# Patient Record
(demographics unavailable — no encounter records)

---

## 2025-03-25 NOTE — PHYSICAL EXAM

## 2025-03-25 NOTE — HISTORY OF PRESENT ILLNESS
[Mother] : mother [Fruit] : fruit [Vegetables] : vegetables [Meat] : meat [Grains] : grains [Eggs] : eggs [Dairy] : dairy [Normal] : Normal [Sippy cup use] : Sippy cup use [Brushing teeth] : Brushing teeth [Vitamin] : Primary Fluoride Source: Vitamin [Playtime (60 min/d)] : Playtime 60 min a day [Temper Tantrums] : Temper Tantrums [< 2 hrs of screen time] : Less than 2 hrs of screen time [No] : No cigarette smoke exposure [Water heater temperature set at <120 degrees F] : Water heater temperature set at <120 degrees F [Car seat in back seat] : Car seat in back seat [Carbon Monoxide Detectors] : Carbon monoxide detectors [Smoke Detectors] : Smoke detectors [Supervised play near cars and streets] : Supervised play near cars and streets [Delayed] : Delayed [FreeTextEntry7] : Doing well [de-identified] : None [FreeTextEntry8] : not yet potty trained [FreeTextEntry1] : 2.5 year old girl here for routine PE. Doing well. No current concerns. Good po/uop/bm. Normal sleep and activity. Many words, short phrases, understands all. Jumps, climbs stairs, pretend play. Growth and development wnl. 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 Pt drinks a lot of juice as per mother, refuses water and milk. Mother has noticed that every time pt goes to grandmother's house, her eyes get swollen, itchy and red. No pets or new exposures that mom can think of. Father passed away 11/2024 after a car accident.

## 2025-03-25 NOTE — DISCUSSION/SUMMARY
[Normal Growth] : growth [Normal Development] : development [None] : No known medical problems [No Elimination Concerns] : elimination [No Feeding Concerns] : feeding [No Skin Concerns] : skin [Normal Sleep Pattern] : sleep [Family Routines] : family routines [Language Promotion and Communication] : language promotion and communication [Social Development] : social development [Safety] : safety [ Considerations] :  considerations [No Medications] : ~He/She~ is not on any medications [Parent/Guardian] : parent/guardian [] : The components of the vaccine(s) to be administered today are listed in the plan of care. The disease(s) for which the vaccine(s) are intended to prevent and the risks have been discussed with the caretaker.  The risks are also included in the appropriate vaccination information statements which have been provided to the patient's caregiver.  The caregiver has given consent to vaccinate. [FreeTextEntry1] : Anticipatory guidance and parent education given. Continue cow's milk. Continue table foods, 3 meals with 2-3 snacks per day. Incorporate water daily in a sippy cup. Brush teeth twice a day with soft toothbrush. Recommend visit to dentist. Fluoride daily. When in car, keep child in rear-facing car seats until age 2, or until  the maximum height and weight for seat is reached. Put toddler to sleep in own bed. Help toddler to maintain consistent daily routines and sleep schedule. Toilet training discussed. Ensure home is safe. Use consistent, positive discipline. Read aloud to toddler. Limit screen time to no more than 2 hours per day. CBC, CMP, Lead, Ferritin ordered and importance of screening stressed. Allergy referral made. D/C juice and only offer milk and water as beverages. Return in 6 months for PE.